# Patient Record
Sex: FEMALE
[De-identification: names, ages, dates, MRNs, and addresses within clinical notes are randomized per-mention and may not be internally consistent; named-entity substitution may affect disease eponyms.]

---

## 2023-09-14 ENCOUNTER — NON-APPOINTMENT (OUTPATIENT)
Age: 48
End: 2023-09-14

## 2023-09-21 ENCOUNTER — NON-APPOINTMENT (OUTPATIENT)
Age: 48
End: 2023-09-21

## 2023-09-22 ENCOUNTER — APPOINTMENT (OUTPATIENT)
Dept: BREAST CENTER | Facility: CLINIC | Age: 48
End: 2023-09-22
Payer: COMMERCIAL

## 2023-09-22 VITALS
WEIGHT: 134 LBS | SYSTOLIC BLOOD PRESSURE: 105 MMHG | HEIGHT: 60 IN | DIASTOLIC BLOOD PRESSURE: 66 MMHG | HEART RATE: 63 BPM | BODY MASS INDEX: 26.31 KG/M2

## 2023-09-22 DIAGNOSIS — Z87.891 PERSONAL HISTORY OF NICOTINE DEPENDENCE: ICD-10-CM

## 2023-09-22 DIAGNOSIS — Z78.9 OTHER SPECIFIED HEALTH STATUS: ICD-10-CM

## 2023-09-22 DIAGNOSIS — Z80.0 FAMILY HISTORY OF MALIGNANT NEOPLASM OF DIGESTIVE ORGANS: ICD-10-CM

## 2023-09-22 PROCEDURE — 99204 OFFICE O/P NEW MOD 45 MIN: CPT

## 2023-09-24 PROBLEM — Z78.9 RARELY CONSUMES ALCOHOL: Status: ACTIVE | Noted: 2023-09-22

## 2023-09-24 PROBLEM — Z78.9 NO FAMILY HISTORY OF OVARIAN CANCER: Status: ACTIVE | Noted: 2023-09-22

## 2023-09-24 PROBLEM — Z87.891 FORMER SMOKER: Status: ACTIVE | Noted: 2023-09-22

## 2023-09-24 PROBLEM — Z80.0 FAMILY HISTORY OF MALIGNANT NEOPLASM OF STOMACH: Status: ACTIVE | Noted: 2023-09-22

## 2023-09-24 RX ORDER — CHOLECALCIFEROL (VITAMIN D3) 25 MCG
TABLET ORAL
Refills: 0 | Status: ACTIVE | COMMUNITY

## 2023-09-25 ENCOUNTER — NON-APPOINTMENT (OUTPATIENT)
Age: 48
End: 2023-09-25

## 2023-10-12 ENCOUNTER — NON-APPOINTMENT (OUTPATIENT)
Age: 48
End: 2023-10-12

## 2023-10-12 DIAGNOSIS — R92.8 OTHER ABNORMAL AND INCONCLUSIVE FINDINGS ON DIAGNOSTIC IMAGING OF BREAST: ICD-10-CM

## 2023-11-06 ENCOUNTER — NON-APPOINTMENT (OUTPATIENT)
Age: 48
End: 2023-11-06

## 2023-11-15 ENCOUNTER — RESULT REVIEW (OUTPATIENT)
Age: 48
End: 2023-11-15

## 2023-11-16 ENCOUNTER — RESULT REVIEW (OUTPATIENT)
Age: 48
End: 2023-11-16

## 2023-11-16 ENCOUNTER — APPOINTMENT (OUTPATIENT)
Dept: BREAST CENTER | Facility: HOSPITAL | Age: 48
End: 2023-11-16

## 2023-11-16 ENCOUNTER — TRANSCRIPTION ENCOUNTER (OUTPATIENT)
Age: 48
End: 2023-11-16

## 2023-11-27 ENCOUNTER — APPOINTMENT (OUTPATIENT)
Dept: BREAST CENTER | Facility: CLINIC | Age: 48
End: 2023-11-27
Payer: COMMERCIAL

## 2023-11-27 DIAGNOSIS — Z80.3 FAMILY HISTORY OF MALIGNANT NEOPLASM OF BREAST: ICD-10-CM

## 2023-11-27 PROCEDURE — 99024 POSTOP FOLLOW-UP VISIT: CPT

## 2023-11-29 ENCOUNTER — APPOINTMENT (OUTPATIENT)
Dept: BREAST CENTER | Facility: CLINIC | Age: 48
End: 2023-11-29

## 2023-12-01 ENCOUNTER — APPOINTMENT (OUTPATIENT)
Dept: HEMATOLOGY ONCOLOGY | Facility: CLINIC | Age: 48
End: 2023-12-01

## 2023-12-04 ENCOUNTER — APPOINTMENT (OUTPATIENT)
Dept: BREAST CENTER | Facility: CLINIC | Age: 48
End: 2023-12-04
Payer: COMMERCIAL

## 2023-12-04 PROCEDURE — 99024 POSTOP FOLLOW-UP VISIT: CPT

## 2023-12-06 ENCOUNTER — NON-APPOINTMENT (OUTPATIENT)
Age: 48
End: 2023-12-06

## 2023-12-08 ENCOUNTER — APPOINTMENT (OUTPATIENT)
Dept: RADIATION ONCOLOGY | Facility: CLINIC | Age: 48
End: 2023-12-08
Payer: COMMERCIAL

## 2023-12-08 VITALS
TEMPERATURE: 98 F | WEIGHT: 135 LBS | SYSTOLIC BLOOD PRESSURE: 130 MMHG | OXYGEN SATURATION: 99 % | RESPIRATION RATE: 16 BRPM | DIASTOLIC BLOOD PRESSURE: 87 MMHG | HEART RATE: 92 BPM | HEIGHT: 60 IN | BODY MASS INDEX: 26.5 KG/M2

## 2023-12-08 PROCEDURE — 99204 OFFICE O/P NEW MOD 45 MIN: CPT | Mod: 25

## 2023-12-13 ENCOUNTER — RESULT REVIEW (OUTPATIENT)
Age: 48
End: 2023-12-13

## 2023-12-13 ENCOUNTER — NON-APPOINTMENT (OUTPATIENT)
Age: 48
End: 2023-12-13

## 2023-12-26 NOTE — DISCUSSION/SUMMARY
[FreeTextEntry1] : RESULTS TRANSMISSION Cleo Pacheco is a 48-year-old female who was called on 12/13/2023 for a discussion regarding their genetic testing results related to hereditary cancer predisposition.   Ms. Pacheco was originally seen at [facility] on 12/01/2023 for hereditary cancer predisposition risk assessment ue to her recently diagnosed breast cancer and family history of cancer. Ms. Pacheco decided to pursue genetic testing using [facility]'s Breast Cancer STAT panel and Breast and Gyn Cancers Guidelines Based Panel (19 genes).  TEST RESULTS:   PALB2 VARIANT OF UNCERTAIN SIGNIFICANCE (VUS) (c.157G>A (p.Lfs56Rvc))  No pathogenic (disease-causing) variants or additional VUSs were detected in the following genes: VASYL, BARD1, BRCA1, BRCA2, BRIP1, CDH1, CHEK2, EPCAM, MLH1, MSH2, MSH6, NF1, PMS2, PTEN, RAD51C, RAD51D, STK11, and TP53.  RESULTS INTERPRETATION AND ASSESSMENT: At this time the available evidence is insufficient to determine the role of this VUS in disease and the clinical significance of this result is uncertain. Individuals with a pathogenic mutation in PALB2 may have an increased risk for female and male breast, ovarian and pancreatic cancer. It is unknown if the patient has an increased risk for the cancers associated with PALB2 at this time.   The detection of this VUS should not currently change the patient's medical management. It is NOT recommended at this time that family members use this result for predictive genetic testing or medical management decisions. With more research, a VUS may be reclassified as either disease-causing or benign. Ms. Pacheco was encouraged to contact us every 2-3 years to enquire about any new information for this variant, or sooner if there are any changes in her personal or family history of cancer.  Such updates could possibly change our risk assessment and recommendations.  We also discussed that, while no clear cause of the patient's personal and family history of cancer was identified, this result, while reassuring, does entirely not rule out a hereditary cancer risk in the patient. It is possible, although unlikely, the patient has a mutation in one of the genes tested that is not detectable by this analysis, or has a mutation in a different gene, either known or unknown. It is also possible there is a hereditary cancer predisposition in the family, but the patient did not inherit it.  Given Ms. Pacheco's personal and current reported family history of cancer, and her negative genetic test results, the following screening guidelines and risk-reducing recommendations were discussed:  BREAST: Long-term management and surveillance should be based on Ms. Pacheco's on- or post-treatment protocol as recommended by her oncology team.  OTHER: In the absence of other indications, Ms. Pacheco should practice age-appropriate cancer screening of other organ systems as recommended for the general population.  PLAN: 1.	See above for recommended management.  2.	Testing of family members based on this VUS is not recommended.  3.	The patient was encouraged to contact us every 2-3 years to check on any changes in interpretation of this VUS, or sooner if there are changes in her personal or family history of cancer. 4.	Patient informed consult note will be available through their [facility] patient portal and genetic test results will be released via [facility] laboratory portal.   For any additional questions please call [facility] at [phone].   oLri Mayberry MS, [facility] Genetic Counselor, [facility]  CC:  Patient Dr. Lola Tinsley

## 2024-01-02 ENCOUNTER — NON-APPOINTMENT (OUTPATIENT)
Age: 49
End: 2024-01-02

## 2024-01-02 VITALS
WEIGHT: 150 LBS | HEART RATE: 72 BPM | SYSTOLIC BLOOD PRESSURE: 121 MMHG | HEIGHT: 60 IN | OXYGEN SATURATION: 100 % | DIASTOLIC BLOOD PRESSURE: 82 MMHG | RESPIRATION RATE: 16 BRPM | BODY MASS INDEX: 29.45 KG/M2

## 2024-01-02 NOTE — HISTORY OF PRESENT ILLNESS
[FreeTextEntry1] : 1/2/2024 Ms. Pacheco presents today for her OTV.  She completed 3/16 fractions for a total of 795 cGy to the left breast.  The patient denies any pain, itching or redness to the treatment site.  She is using Calendula cream twice a day.  Her appetite is healthy.

## 2024-01-02 NOTE — REVIEW OF SYSTEMS
[Pruritus: Grade 0] : Pruritus: Grade 0 [Skin Hyperpigmentation: Grade 0] : Skin Hyperpigmentation: Grade 0 [Skin Induration: Grade 0] : Skin Induration: Grade 0 [Dermatitis Radiation: Grade 0] : Dermatitis Radiation: Grade 0 [Fatigue: Grade 0] : Fatigue: Grade 0 [Breast Pain: Grade 0] : Breast Pain: Grade 0

## 2024-01-02 NOTE — PHYSICAL EXAM
[Sclera] : the sclera and conjunctiva were normal [] : no respiratory distress [Normal] : oriented to person, place and time, the affect was normal, the mood was normal and not anxious [de-identified] : no erythema or edema noted [de-identified] : dry flaky skin

## 2024-01-02 NOTE — DISEASE MANAGEMENT
[Pathological] : TNM Stage: p [0] : 0 [TTNM] : is [NTNM] : x [MTNM] : x [de-identified] : Patient completed 3/16 fractions for a total of 795 cGy to the left breast.

## 2024-01-05 ENCOUNTER — APPOINTMENT (OUTPATIENT)
Dept: BREAST CENTER | Facility: CLINIC | Age: 49
End: 2024-01-05
Payer: COMMERCIAL

## 2024-01-05 VITALS
HEART RATE: 71 BPM | SYSTOLIC BLOOD PRESSURE: 123 MMHG | HEIGHT: 60 IN | WEIGHT: 150 LBS | BODY MASS INDEX: 29.45 KG/M2 | DIASTOLIC BLOOD PRESSURE: 86 MMHG

## 2024-01-05 DIAGNOSIS — R92.30 DENSE BREASTS, UNSPECIFIED: ICD-10-CM

## 2024-01-05 PROCEDURE — 99024 POSTOP FOLLOW-UP VISIT: CPT

## 2024-01-05 RX ORDER — PREDNISONE 10 MG/1
10 TABLET ORAL
Qty: 19 | Refills: 0 | Status: DISCONTINUED | COMMUNITY
Start: 2023-11-27 | End: 2024-01-05

## 2024-01-05 RX ORDER — OMEGA-3/DHA/EPA/FISH OIL 910-1400MG
CAPSULE ORAL
Refills: 0 | Status: DISCONTINUED | COMMUNITY
End: 2024-01-05

## 2024-01-05 RX ORDER — CLOBETASOL PROPIONATE 0.5 MG/G
0.05 OINTMENT TOPICAL TWICE DAILY
Qty: 1 | Refills: 0 | Status: DISCONTINUED | COMMUNITY
Start: 2023-11-27 | End: 2024-01-05

## 2024-01-05 NOTE — ASSESSMENT
[FreeTextEntry1] : doing well cont rtx as scheduled tamoxifen per dr. angulo after rtx plan mg/sono SEPT 2024 plan  MRI DEC 2024 f/u 4 month She knows to call or return sooner should any concerns or questions arise.

## 2024-01-05 NOTE — HISTORY OF PRESENT ILLNESS
[FreeTextEntry1] : This is a 48-year-old female referred by Dr. 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 The patient had screening mammogram (9/2/2023) which showed a new cluster of microcalcifications in the left UOQ N10. Diagnostic mammogram with mag views confirmed mildly pleomorphic calcifications in a linear distribution. A stereotactic biopsy was advised and done on 9/11/2023. Pathology of left 1:00 (U-shaped clip) showed LCIS, classic type, and dense stromal fibrosis. She presents with her  for consultation today. 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 She has a son at [employer] [facility] [employer] and a son in the 8th grade.  She is s/p Right breast 11:00 mammo guided biopsy on 10/30/2023, pathology showed PASH, and Focal UDH.   She is s/p Left pmx on 11/16/2023 pathology showed DCIS anterior negative, posterior showed 2 foci 3mm and 1.5mm narrowest margin 1mm posterior, ER 80%, IA 95%. She saw Dr. Young 12/22/23. She began RTx 6/20 tx. She has no breast complaints today.  She does not do SBE.  She has not noticed a change in her breast or a breast lump. except post bx lumpiness and bruising She has not noticed a change in her nipple or nipple area. She has not noticed a change in the skin of the breast except post bx bruising. She is not experiencing nipple discharge. She is not experiencing breast pain. She has not noticed a lump or lymph node under the armpit.   BREAST CANCER RISK FACTORS Menarche: 9 Date of LMP: 11/28/23 Menopause: pre Grav:  3    Para: 2 Age at first live birth: 28 Nursed: Y Hysterectomy: N Oophorectomy: N  OCP: N  HRT: N  Last pap/pelvic exam: 2/23/2022 WNL Related family history: Pat Aunt Breast CA age 30  Ashkenazi: N Mastery risk assessment: BRCAPRO 12.9% TCv7 63.9% TCv8 66.2% Meghann n/a Torin 9.3% BRCA testing: pending 12/1 Bra size: 34C  Last mammogram: 9/8/2023                Location: [facility] reviewed.                                 Images reviewed. Results: BI-RADS 4 Dense breasts, indeterminate left breast calcs for which stereotactic biopsy is suggested.   Last ultrasound:  9/2/2023                   Location: [facility] reviewed.                                 Images reviewed.  Results: BI-RADS 2 No suspicious findings.  Last MRI:  10/10/2023                                       Location: [facility] reviewed. Results: BI-RADS 4 Indeterminate enhancement within the left breast for which MRIGbx is suggested. Mild enhancement remains adjacent ti localizing clip within the left breast at the site of stereotactic biopsy yielding lobular carcinoma in situ. Right breast calcifications for which right breast stereotactic biopsy is suggested,

## 2024-01-05 NOTE — CONSULT LETTER
[Dear  ___] : Dear  [unfilled], [Courtesy Letter:] : I had the pleasure of seeing your patient, [unfilled], in my office today. [Please see my note below.] : Please see my note below. [Consult Closing:] : Thank you very much for allowing me to participate in the care of this patient.  If you have any questions, please do not hesitate to contact me. [Sincerely,] : Sincerely, [DrMark  ___] : Dr. TAFOYA [DrMark ___] : Dr. TAFOYA [FreeTextEntry3] : Luci Tinsley [place] [address]

## 2024-01-10 ENCOUNTER — NON-APPOINTMENT (OUTPATIENT)
Age: 49
End: 2024-01-10

## 2024-01-10 VITALS
RESPIRATION RATE: 16 BRPM | SYSTOLIC BLOOD PRESSURE: 117 MMHG | HEART RATE: 72 BPM | WEIGHT: 150 LBS | BODY MASS INDEX: 29.45 KG/M2 | OXYGEN SATURATION: 99 % | DIASTOLIC BLOOD PRESSURE: 82 MMHG | HEIGHT: 60 IN

## 2024-01-12 NOTE — DISEASE MANAGEMENT
[Pathological] : TNM Stage: p [0] : 0 [TTNM] : is [NTNM] : x [MTNM] : x [de-identified] : Patient completed 9/16 fractions for a total of 2385 cGy to the left breast.

## 2024-01-12 NOTE — PHYSICAL EXAM
[Sclera] : the sclera and conjunctiva were normal [] : no respiratory distress [Nondistended] : nondistended [Normal] : oriented to person, place and time, the affect was normal, the mood was normal and not anxious [de-identified] : faint erythema left breast, no desquamation

## 2024-01-12 NOTE — HISTORY OF PRESENT ILLNESS
[FreeTextEntry1] : 1/2/2024 Ms. Pacheco presents today for her OTV.  She completed 3/16 fractions for a total of 795 cGy to the left breast.  The patient denies any pain, itching or redness to the treatment site.  She is using Calendula cream twice a day.  Her appetite is healthy.  1/10/2024 Ms. Pacheco presents today for her OTV.  She completed 9/16 fractions for a total of 2385 cGy to the left breast.  The patient denies any pain or discomfort, redness or itching.  She is using Calendula cream twice a day.  Her appetite is healthy.

## 2024-01-16 ENCOUNTER — NON-APPOINTMENT (OUTPATIENT)
Age: 49
End: 2024-01-16

## 2024-01-16 VITALS
BODY MASS INDEX: 29.45 KG/M2 | SYSTOLIC BLOOD PRESSURE: 119 MMHG | RESPIRATION RATE: 16 BRPM | DIASTOLIC BLOOD PRESSURE: 81 MMHG | HEIGHT: 60 IN | OXYGEN SATURATION: 100 % | WEIGHT: 150 LBS | HEART RATE: 77 BPM

## 2024-01-16 NOTE — HISTORY OF PRESENT ILLNESS
[FreeTextEntry1] : 1/2/2024 Ms. Pacheco presents today for her OTV.  She completed 3/16 fractions for a total of 795 cGy to the left breast.  The patient denies any pain, itching or redness to the treatment site.  She is using Calendula cream twice a day.  Her appetite is healthy.  1/10/2024 Ms. Pacheco presents today for her OTV.  She completed 9/16 fractions for a total of 2385 cGy to the left breast.  The patient denies any pain or discomfort, redness or itching.  She is using Calendula cream twice a day.  Her appetite is healthy.    1/16/2024 Ms. Pacheco presents today for her OTV.  She completed 12/16 fractions for a total of 3180 cgy to the left breast.  The patient denies any pain or discomfort., redness or itching.  She is using Calendula cream twice a day.  Her appetite is healthy.

## 2024-01-16 NOTE — DISEASE MANAGEMENT
[Pathological] : TNM Stage: p [0] : 0 [TTNM] : is [NTNM] : x [MTNM] : x [de-identified] : Patient completed 12/16 fractions for a total of 3180 cGy to the left breast.

## 2024-01-16 NOTE — PHYSICAL EXAM
[Sclera] : the sclera and conjunctiva were normal [] : no respiratory distress [Normal] : oriented to person, place and time, the affect was normal, the mood was normal and not anxious [de-identified] : faint erythema left breast w/o desquamation

## 2024-01-16 NOTE — REVIEW OF SYSTEMS
[Fatigue: Grade 1 - Fatigue relieved by rest] : Fatigue: Grade 1 - Fatigue relieved by rest [Skin Hyperpigmentation: Grade 0] : Skin Hyperpigmentation: Grade 0 [Dermatitis Radiation: Grade 0] : Dermatitis Radiation: Grade 0 [Breast Pain: Grade 0] : Breast Pain: Grade 0

## 2024-01-22 ENCOUNTER — NON-APPOINTMENT (OUTPATIENT)
Age: 49
End: 2024-01-22

## 2024-01-22 VITALS
HEART RATE: 76 BPM | SYSTOLIC BLOOD PRESSURE: 123 MMHG | HEIGHT: 60 IN | RESPIRATION RATE: 16 BRPM | WEIGHT: 151 LBS | DIASTOLIC BLOOD PRESSURE: 73 MMHG | OXYGEN SATURATION: 99 % | BODY MASS INDEX: 29.64 KG/M2

## 2024-01-23 NOTE — HISTORY OF PRESENT ILLNESS
[FreeTextEntry1] : 1/2/2024 Ms. Pacheco presents today for her OTV.  She completed 3/16 fractions for a total of 795 cGy to the left breast.  The patient denies any pain, itching or redness to the treatment site.  She is using Calendula cream twice a day.  Her appetite is healthy.  1/10/2024 Ms. Pacheco presents today for her OTV.  She completed 9/16 fractions for a total of 2385 cGy to the left breast.  The patient denies any pain or discomfort, redness or itching.  She is using Calendula cream twice a day.  Her appetite is healthy.    1/16/2024 Ms. Pacheco presents today for her OTV.  She completed 12/16 fractions for a total of 3180 cgy to the left breast.  The patient denies any pain or discomfort., redness or itching.  She is using Calendula cream twice a day.  Her appetite is healthy.  1//22/2024 Ms. Pacheco presents today for her OTV.  She completed 16/16 fractions for a total of 4240 cGy to the left breast.  The patient reports no discomfort,redness or itching.  She is using Calendula cream twice a day.  Her appetite is healthy.

## 2024-01-23 NOTE — PHYSICAL EXAM
[Sclera] : the sclera and conjunctiva were normal [] : no respiratory distress [Nondistended] : nondistended [Supraclavicular Lymph Nodes Enlarged Bilaterally] : supraclavicular [Axillary Lymph Nodes Enlarged Bilaterally] : axillary [Normal] : oriented to person, place and time, the affect was normal, the mood was normal and not anxious [de-identified] : mild erythema left breast

## 2024-01-23 NOTE — REVIEW OF SYSTEMS
[Fatigue: Grade 1 - Fatigue relieved by rest] : Fatigue: Grade 1 - Fatigue relieved by rest [Skin Hyperpigmentation: Grade 1 - Hyperpigmentation covering <10% BSA; no psychosocial impact] : Skin Hyperpigmentation: Grade 1 - Hyperpigmentation covering <10% BSA; no psychosocial impact [Breast Pain: Grade 0] : Breast Pain: Grade 0 [Pruritus: Grade 0] : Pruritus: Grade 0 [Skin Hyperpigmentation: Grade 0] : Skin Hyperpigmentation: Grade 0 [Dermatitis Radiation: Grade 1 - Faint erythema or dry desquamation] : Dermatitis Radiation: Grade 1 - Faint erythema or dry desquamation

## 2024-01-23 NOTE — DISEASE MANAGEMENT
[Pathological] : TNM Stage: p [0] : 0 [TTNM] : is [NTNM] : x [MTNM] : x [de-identified] : Patient completed 16/16 fractions for a total of 4240cGy to the left breast.

## 2024-02-21 ENCOUNTER — NON-APPOINTMENT (OUTPATIENT)
Age: 49
End: 2024-02-21

## 2024-02-23 ENCOUNTER — APPOINTMENT (OUTPATIENT)
Dept: RADIATION ONCOLOGY | Facility: CLINIC | Age: 49
End: 2024-02-23
Payer: COMMERCIAL

## 2024-02-23 ENCOUNTER — NON-APPOINTMENT (OUTPATIENT)
Age: 49
End: 2024-02-23

## 2024-02-23 VITALS
RESPIRATION RATE: 16 BRPM | TEMPERATURE: 98 F | OXYGEN SATURATION: 98 % | DIASTOLIC BLOOD PRESSURE: 83 MMHG | SYSTOLIC BLOOD PRESSURE: 119 MMHG | HEART RATE: 83 BPM

## 2024-02-23 PROCEDURE — 99024 POSTOP FOLLOW-UP VISIT: CPT

## 2024-02-23 RX ORDER — TAMOXIFEN CITRATE 20 MG/1
TABLET, FILM COATED ORAL
Refills: 0 | Status: ACTIVE | COMMUNITY

## 2024-02-23 NOTE — DISEASE MANAGEMENT
[TTNM] : is [NTNM] : x [MTNM] : x [de-identified] : Patient completed 16/16 fractions for a total of 4240cGy to the left breast.  Then boost 4 Fxs to 1000cGY complete on 1/26/24

## 2024-02-23 NOTE — PHYSICAL EXAM
[Sclera] : the sclera and conjunctiva were normal [] : no respiratory distress [Nondistended] : nondistended [Supraclavicular Lymph Nodes Enlarged Bilaterally] : supraclavicular [Axillary Lymph Nodes Enlarged Bilaterally] : axillary [de-identified] : hyperpigmentation left breast limited to RT field; erythema in the midline and inframammary fold c/w fungal infection [Normal] : oriented to person, place and time, the affect was normal, the mood was normal and not anxious [de-identified] : as above

## 2024-02-23 NOTE — VITALS
[Least Pain Intensity: 0/10] : 0/10 [Maximal Pain Intensity: 0/10] : 0/10 [NoTreatment Scheduled] : no treatment scheduled [80: Normal activity with effort; some signs or symptoms of disease.] : 80: Normal activity with effort; some signs or symptoms of disease.

## 2024-02-23 NOTE — HISTORY OF PRESENT ILLNESS
[FreeTextEntry1] : This is a 48-year woman with DCIS of the left breast, status-post breast-conserving surgery, presenting for PTE.  Patient completed 16/16 fractions for a total of 4240cGy to the left breast and a boost of 4 Fxs to 1000cGY complete on 1/26/24.  She was started on Tamoxifen by Dr Young.  She followed up with Dr Tinsley on 1/5/24.  Scans are planned for Sept 2024 Rosita/US and MRI Dec 2024.    She has been feeling well until earlier this week when she started to experience redness and pain on the outer left breast. She went to Urgent Care and was given Doxycycline and an antifungal cream. She states that she is feeling better today and with no pain just some sensitivity.

## 2024-03-01 ENCOUNTER — APPOINTMENT (OUTPATIENT)
Dept: BREAST CENTER | Facility: CLINIC | Age: 49
End: 2024-03-01
Payer: COMMERCIAL

## 2024-03-01 VITALS
SYSTOLIC BLOOD PRESSURE: 116 MMHG | BODY MASS INDEX: 29.64 KG/M2 | HEART RATE: 74 BPM | HEIGHT: 60 IN | WEIGHT: 151 LBS | DIASTOLIC BLOOD PRESSURE: 79 MMHG

## 2024-03-01 DIAGNOSIS — R21 RASH AND OTHER NONSPECIFIC SKIN ERUPTION: ICD-10-CM

## 2024-03-01 PROCEDURE — 99214 OFFICE O/P EST MOD 30 MIN: CPT

## 2024-03-01 RX ORDER — ZINC SULFATE 66 MG
TABLET ORAL
Refills: 0 | Status: ACTIVE | COMMUNITY

## 2024-03-01 NOTE — CONSULT LETTER
[Dear  ___] : Dear  [unfilled], [Please see my note below.] : Please see my note below. [Courtesy Letter:] : I had the pleasure of seeing your patient, [unfilled], in my office today. [Consult Closing:] : Thank you very much for allowing me to participate in the care of this patient.  If you have any questions, please do not hesitate to contact me. [Sincerely,] : Sincerely, [DrMark  ___] : Dr. TAFOYA [DrMark ___] : Dr. TAFOYA [FreeTextEntry3] : Luci Tinsley [place] [address]

## 2024-03-01 NOTE — PHYSICAL EXAM
[Normocephalic] : normocephalic [Atraumatic] : atraumatic [Supple] : supple [Examined in the supine and seated position] : examined in the supine and seated position [No Supraclavicular Adenopathy] : no supraclavicular adenopathy [Symmetrical] : symmetrical [No dominant masses] : no dominant masses in right breast  [No dominant masses] : no dominant masses left breast [No Nipple Retraction] : no right nipple retraction [No Nipple Discharge] : no right nipple discharge [No Axillary Lymphadenopathy] : no right axillary lymphadenopathy [No Edema] : no edema [No Ulceration] : no ulceration [de-identified] : healed incision UOQ, rtx tattoos, skin bronzing as expected post RTx, there is a fungal rash and papular rash on right IMF and along abdomen left side [de-identified] : see above

## 2024-03-01 NOTE — HISTORY OF PRESENT ILLNESS
[FreeTextEntry1] : This is a 48-year-old female referred by Dr. 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 The patient had screening mammogram (9/2/2023) which showed a new cluster of microcalcifications in the left UOQ N10. Diagnostic mammogram with mag views confirmed mildly pleomorphic calcifications in a linear distribution. A stereotactic biopsy was advised and done on 9/11/2023. Pathology of left 1:00 (U-shaped clip) showed LCIS, classic type, and dense stromal fibrosis. She presents with her  for consultation today. 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 She has a son at [employer] [facility] [employer] and a son in the 8th grade.  She is s/p Right breast 11:00 mammo guided biopsy on 10/30/2023, pathology showed PASH, and Focal UDH.   She is s/p Left pmx on 11/16/2023 pathology showed DCIS anterior negative, posterior showed 2 foci 3mm and 1.5mm narrowest margin 1mm posterior, ER 80%, OH 95%. She saw Dr. Young and is on tamoxifen. She completed RTx 1/26/2024. She has no breast complaints today.  She does not do SBE.  She has not noticed a change in her breast or a breast lump. except post bx lumpiness and bruising She has not noticed a change in her nipple or nipple area. She has not noticed a change in the skin of the breast except post bx bruising. She is not experiencing nipple discharge. She is not experiencing breast pain. She has not noticed a lump or lymph node under the armpit.   BREAST CANCER RISK FACTORS Menarche: 9 Date of LMP: 11/28/2023 Menopause: pre Grav:  3    Para: 2 Age at first live birth: 28 Nursed: Y Hysterectomy: N Oophorectomy: N  OCP: N  HRT: N  Last pap/pelvic exam: 12/2023 WNL Related family history: Pat Aunt Breast CA age 30  Ashkenazi: N Mastery risk assessment: BRCAPRO 12.9% TCv7 63.9% TCv8 66.2% Meghann n/a Torin 9.3% BRCA testing: pending 12/1 Bra size: 34C  Last mammogram: 9/8/2023                Location: [facility] reviewed.                                 Images reviewed. Results: BI-RADS 4 Dense breasts, indeterminate left breast calcs for which stereotactic biopsy is suggested.   Last ultrasound:  9/2/2023                   Location: [facility] reviewed.                                 Images reviewed.  Results: BI-RADS 2 No suspicious findings.  Last MRI:  10/10/2023                                       Location: [facility] reviewed. Results: BI-RADS 4 Indeterminate enhancement within the left breast for which MRIGbx is suggested. Mild enhancement remains adjacent ti localizing clip within the left breast at the site of stereotactic biopsy yielding lobular carcinoma in situ. Right breast calcifications for which right breast stereotactic biopsy is suggested,

## 2024-03-01 NOTE — ASSESSMENT
[FreeTextEntry1] : doing well rec derm evaluation cont tamoxifen and surveillance per dr. angulo  plan mg/sono SEPT 2024 plan  MRI DEC 2024 f/u 4 month She knows to call or return sooner should any concerns or questions arise.

## 2024-03-04 PROBLEM — R21 SKIN RASH: Status: ACTIVE | Noted: 2023-11-24

## 2024-05-10 ENCOUNTER — APPOINTMENT (OUTPATIENT)
Dept: BREAST CENTER | Facility: CLINIC | Age: 49
End: 2024-05-10

## 2024-06-14 ENCOUNTER — APPOINTMENT (OUTPATIENT)
Dept: BREAST CENTER | Facility: CLINIC | Age: 49
End: 2024-06-14
Payer: COMMERCIAL

## 2024-06-14 VITALS
HEART RATE: 82 BPM | WEIGHT: 134 LBS | DIASTOLIC BLOOD PRESSURE: 72 MMHG | SYSTOLIC BLOOD PRESSURE: 103 MMHG | BODY MASS INDEX: 26.31 KG/M2 | OXYGEN SATURATION: 98 % | HEIGHT: 60 IN

## 2024-06-14 DIAGNOSIS — D05.02 LOBULAR CARCINOMA IN SITU OF LEFT BREAST: ICD-10-CM

## 2024-06-14 DIAGNOSIS — Z00.00 ENCOUNTER FOR GENERAL ADULT MEDICAL EXAMINATION W/OUT ABNORMAL FINDINGS: ICD-10-CM

## 2024-06-14 DIAGNOSIS — N60.82 OTHER BENIGN MAMMARY DYSPLASIAS OF LEFT BREAST: ICD-10-CM

## 2024-06-14 DIAGNOSIS — D05.12 INTRADUCTAL CARCINOMA IN SITU OF LEFT BREAST: ICD-10-CM

## 2024-06-14 PROCEDURE — 99214 OFFICE O/P EST MOD 30 MIN: CPT

## 2024-06-14 RX ORDER — TRIAMCINOLONE ACETONIDE 1 MG/G
0.1 CREAM TOPICAL TWICE DAILY
Qty: 1 | Refills: 4 | Status: DISCONTINUED | COMMUNITY
Start: 2023-11-24 | End: 2024-06-14

## 2024-06-14 RX ORDER — DOXYCYCLINE HYCLATE 50 MG/1
CAPSULE ORAL
Refills: 0 | Status: DISCONTINUED | COMMUNITY
End: 2024-06-14

## 2024-06-14 RX ORDER — NYSTATIN 100000 U/G
100000 OINTMENT TOPICAL
Refills: 0 | Status: DISCONTINUED | COMMUNITY
End: 2024-06-14

## 2024-06-14 RX ORDER — ATORVASTATIN CALCIUM 20 MG/1
20 TABLET, FILM COATED ORAL
Refills: 0 | Status: ACTIVE | COMMUNITY

## 2024-06-14 RX ORDER — CLOTRIMAZOLE 10 MG/G
1 CREAM TOPICAL
Refills: 0 | Status: DISCONTINUED | COMMUNITY
End: 2024-06-14

## 2024-06-14 RX ORDER — ATORVASTATIN CALCIUM 80 MG/1
TABLET, FILM COATED ORAL
Refills: 0 | Status: DISCONTINUED | COMMUNITY
End: 2024-06-14

## 2024-06-14 NOTE — ASSESSMENT
[FreeTextEntry1] : 48 yo female with h/o left breast DCIS, ER+ rec derm evaluation cont tamoxifen and surveillance per dr. angulo  plan mg/sono SEPT 2024 plan  MRI DEC 2024 rec biafine cream and topical vitamin e oil f/u [id number] She knows to call or return sooner should any concerns or questions arise.

## 2024-06-14 NOTE — CONSULT LETTER
[Dear  ___] : Dear  [unfilled], [Courtesy Letter:] : I had the pleasure of seeing your patient, [unfilled], in my office today. [Please see my note below.] : Please see my note below. [Consult Closing:] : Thank you very much for allowing me to participate in the care of this patient.  If you have any questions, please do not hesitate to contact me. [Sincerely,] : Sincerely, [DrMark  ___] : Dr. TAFOYA [DrMark ___] : Dr. TAFOYA [FreeTextEntry3] : Lcui Tinsley [place] [address]

## 2024-06-14 NOTE — HISTORY OF PRESENT ILLNESS
[FreeTextEntry1] : This is a 49-year-old female referred by Dr. 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 The patient had screening mammogram (9/2/2023) which showed a new cluster of microcalcifications in the left UOQ N10. Diagnostic mammogram with mag views confirmed mildly pleomorphic calcifications in a linear distribution. A stereotactic biopsy was advised and done on 9/11/2023. Pathology of left 1:00 (U-shaped clip) showed LCIS, classic type, and dense stromal fibrosis. She presents with her  for consultation today. 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 She has a son at [employer] and a son in the 8th grade.  She is s/p Right breast 11:00 mammo guided biopsy on 10/30/2023, pathology showed PASH, and Focal UDH.   She is s/p Left pmx on 11/16/2023 pathology showed DCIS anterior negative, posterior showed 2 foci 3mm and 1.5mm narrowest margin 1mm posterior, ER 80%, LA 95%. She saw Dr. Young and is on tamoxifen. She completed RTx 1/26/2024. She has no breast complaints today.  She does not do SBE.  She has not noticed a change in her breast or a breast lump. except post bx lumpiness and bruising She has not noticed a change in her nipple or nipple area. She has not noticed a change in the skin of the breast except post bx bruising. She is not experiencing nipple discharge. She is not experiencing breast pain. She has not noticed a lump or lymph node under the armpit.   BREAST CANCER RISK FACTORS Menarche: 9 Date of LMP: 11/28/2023 Menopause: pre Grav:  3    Para: 2 Age at first live birth: 28 Nursed: Y Hysterectomy: N Oophorectomy: N  OCP: N  HRT: N  Last pap/pelvic exam: 12/2023 WNL Related family history: Pat Aunt Breast CA age 30  Ashkenazi: N Mastery risk assessment: BRCAPRO 12.9% TCv7 63.9% TCv8 66.2% Meghann n/a Torin 9.3% BRCA testing: pending 12/1 Bra size: 34C  Last mammogram: 9/8/2023                Location: [facility] reviewed.                                 Images reviewed. Results: BI-RADS 4 Dense breasts, indeterminate left breast calcs for which stereotactic biopsy is suggested.   Last ultrasound:  9/2/2023                   Location: [facility] reviewed.                                 Images reviewed.  Results: BI-RADS 2 No suspicious findings.  Last MRI:  10/10/2023                                       Location: [facility] reviewed. Results: BI-RADS 4 Indeterminate enhancement within the left breast for which MRIGbx is suggested. Mild enhancement remains adjacent ti localizing clip within the left breast at the site of stereotactic biopsy yielding lobular carcinoma in situ. Right breast calcifications for which right breast stereotactic biopsy is suggested,

## 2024-06-14 NOTE — PHYSICAL EXAM
[Normocephalic] : normocephalic [Atraumatic] : atraumatic [Supple] : supple [No Supraclavicular Adenopathy] : no supraclavicular adenopathy [Examined in the supine and seated position] : examined in the supine and seated position [Symmetrical] : symmetrical [No dominant masses] : no dominant masses in right breast  [No dominant masses] : no dominant masses left breast [No Nipple Retraction] : no left nipple retraction [No Nipple Discharge] : no left nipple discharge [No Axillary Lymphadenopathy] : no left axillary lymphadenopathy [No Edema] : no edema [No Ulceration] : no ulceration [de-identified] : healed incision UOQ, rtx tattoos, skin bronzing as expected post RTx, no masses [de-identified] : see above

## 2024-07-09 ENCOUNTER — APPOINTMENT (OUTPATIENT)
Dept: RADIATION ONCOLOGY | Facility: CLINIC | Age: 49
End: 2024-07-09
Payer: COMMERCIAL

## 2024-07-09 VITALS
TEMPERATURE: 98 F | SYSTOLIC BLOOD PRESSURE: 133 MMHG | RESPIRATION RATE: 16 BRPM | DIASTOLIC BLOOD PRESSURE: 94 MMHG | HEART RATE: 84 BPM | OXYGEN SATURATION: 98 %

## 2024-07-09 DIAGNOSIS — D05.12 INTRADUCTAL CARCINOMA IN SITU OF LEFT BREAST: ICD-10-CM

## 2024-07-09 PROCEDURE — 99213 OFFICE O/P EST LOW 20 MIN: CPT

## 2024-07-09 PROCEDURE — G2211 COMPLEX E/M VISIT ADD ON: CPT

## 2024-09-10 ENCOUNTER — RESULT REVIEW (OUTPATIENT)
Age: 49
End: 2024-09-10

## 2024-11-18 ENCOUNTER — NON-APPOINTMENT (OUTPATIENT)
Age: 49
End: 2024-11-18

## 2024-12-02 ENCOUNTER — RESULT REVIEW (OUTPATIENT)
Age: 49
End: 2024-12-02

## 2024-12-11 ENCOUNTER — APPOINTMENT (OUTPATIENT)
Dept: DERMATOLOGY | Facility: CLINIC | Age: 49
End: 2024-12-11
Payer: COMMERCIAL

## 2024-12-11 VITALS — BODY MASS INDEX: 26.31 KG/M2 | WEIGHT: 134 LBS | HEIGHT: 60 IN

## 2024-12-11 DIAGNOSIS — L64.9 ANDROGENIC ALOPECIA, UNSPECIFIED: ICD-10-CM

## 2024-12-11 PROCEDURE — 99203 OFFICE O/P NEW LOW 30 MIN: CPT

## 2025-01-08 ENCOUNTER — NON-APPOINTMENT (OUTPATIENT)
Age: 50
End: 2025-01-08

## 2025-01-08 ENCOUNTER — APPOINTMENT (OUTPATIENT)
Dept: RADIATION ONCOLOGY | Facility: CLINIC | Age: 50
End: 2025-01-08
Payer: COMMERCIAL

## 2025-01-08 VITALS
SYSTOLIC BLOOD PRESSURE: 100 MMHG | WEIGHT: 150 LBS | DIASTOLIC BLOOD PRESSURE: 72 MMHG | BODY MASS INDEX: 29.3 KG/M2 | TEMPERATURE: 98 F | HEART RATE: 82 BPM | RESPIRATION RATE: 97 BRPM | OXYGEN SATURATION: 97 %

## 2025-01-08 DIAGNOSIS — D05.12 INTRADUCTAL CARCINOMA IN SITU OF LEFT BREAST: ICD-10-CM

## 2025-01-08 PROCEDURE — 99213 OFFICE O/P EST LOW 20 MIN: CPT

## 2025-01-08 PROCEDURE — G2211 COMPLEX E/M VISIT ADD ON: CPT

## 2025-01-08 RX ORDER — NICOTINE POLACRILEX 2 MG
1 GUM BUCCAL
Refills: 0 | Status: ACTIVE | COMMUNITY

## 2025-01-24 ENCOUNTER — APPOINTMENT (OUTPATIENT)
Dept: BREAST CENTER | Facility: CLINIC | Age: 50
End: 2025-01-24
Payer: COMMERCIAL

## 2025-01-24 VITALS
WEIGHT: 150 LBS | DIASTOLIC BLOOD PRESSURE: 78 MMHG | SYSTOLIC BLOOD PRESSURE: 116 MMHG | HEART RATE: 92 BPM | BODY MASS INDEX: 29.45 KG/M2 | HEIGHT: 60 IN

## 2025-01-24 DIAGNOSIS — D05.12 INTRADUCTAL CARCINOMA IN SITU OF LEFT BREAST: ICD-10-CM

## 2025-01-24 DIAGNOSIS — N60.82 OTHER BENIGN MAMMARY DYSPLASIAS OF LEFT BREAST: ICD-10-CM

## 2025-01-24 DIAGNOSIS — R92.30 DENSE BREASTS, UNSPECIFIED: ICD-10-CM

## 2025-01-24 DIAGNOSIS — D05.02 LOBULAR CARCINOMA IN SITU OF LEFT BREAST: ICD-10-CM

## 2025-01-24 DIAGNOSIS — Z91.89 OTHER SPECIFIED PERSONAL RISK FACTORS, NOT ELSEWHERE CLASSIFIED: ICD-10-CM

## 2025-01-24 PROCEDURE — 99215 OFFICE O/P EST HI 40 MIN: CPT

## 2025-07-07 ENCOUNTER — NON-APPOINTMENT (OUTPATIENT)
Age: 50
End: 2025-07-07

## 2025-07-09 ENCOUNTER — APPOINTMENT (OUTPATIENT)
Dept: RADIATION ONCOLOGY | Facility: CLINIC | Age: 50
End: 2025-07-09
Payer: COMMERCIAL

## 2025-07-09 VITALS
SYSTOLIC BLOOD PRESSURE: 127 MMHG | HEART RATE: 85 BPM | RESPIRATION RATE: 14 BRPM | DIASTOLIC BLOOD PRESSURE: 74 MMHG | BODY MASS INDEX: 31.25 KG/M2 | OXYGEN SATURATION: 98 % | WEIGHT: 160 LBS

## 2025-07-09 PROCEDURE — 99213 OFFICE O/P EST LOW 20 MIN: CPT

## 2025-07-09 RX ORDER — NYSTATIN 100000 [USP'U]/G
100000 CREAM TOPICAL 3 TIMES DAILY
Qty: 1 | Refills: 1 | Status: ACTIVE | COMMUNITY
Start: 2025-07-09 | End: 1900-01-01

## 2025-09-12 ENCOUNTER — RESULT REVIEW (OUTPATIENT)
Age: 50
End: 2025-09-12